# Patient Record
Sex: MALE | Race: WHITE | Employment: OTHER | RURAL
[De-identification: names, ages, dates, MRNs, and addresses within clinical notes are randomized per-mention and may not be internally consistent; named-entity substitution may affect disease eponyms.]

---

## 2018-01-30 ENCOUNTER — OFFICE VISIT (OUTPATIENT)
Dept: CARDIOLOGY CLINIC | Age: 73
End: 2018-01-30

## 2018-01-30 VITALS
OXYGEN SATURATION: 96 % | HEART RATE: 58 BPM | BODY MASS INDEX: 37.02 KG/M2 | SYSTOLIC BLOOD PRESSURE: 158 MMHG | HEIGHT: 65 IN | WEIGHT: 222.2 LBS | RESPIRATION RATE: 16 BRPM | DIASTOLIC BLOOD PRESSURE: 78 MMHG

## 2018-01-30 DIAGNOSIS — R06.02 SOB (SHORTNESS OF BREATH): ICD-10-CM

## 2018-01-30 DIAGNOSIS — I50.42 CHRONIC COMBINED SYSTOLIC AND DIASTOLIC CONGESTIVE HEART FAILURE (HCC): Primary | ICD-10-CM

## 2018-01-30 DIAGNOSIS — I10 ESSENTIAL HYPERTENSION: ICD-10-CM

## 2018-01-30 DIAGNOSIS — R60.9 FLUID RETENTION: ICD-10-CM

## 2018-01-30 DIAGNOSIS — C80.1 CANCER (HCC): ICD-10-CM

## 2018-01-30 DIAGNOSIS — N18.6 ESRD (END STAGE RENAL DISEASE) (HCC): ICD-10-CM

## 2018-01-30 RX ORDER — ALBUTEROL SULFATE 0.83 MG/ML
SOLUTION RESPIRATORY (INHALATION) AS NEEDED
COMMUNITY

## 2018-01-30 RX ORDER — SENNOSIDES 8.6 MG/1
1 TABLET ORAL DAILY
COMMUNITY

## 2018-01-30 RX ORDER — ALBUTEROL SULFATE 90 UG/1
AEROSOL, METERED RESPIRATORY (INHALATION) AS NEEDED
COMMUNITY

## 2018-01-30 RX ORDER — AMIODARONE HYDROCHLORIDE 200 MG/1
200 TABLET ORAL DAILY
COMMUNITY

## 2018-01-30 RX ORDER — ATORVASTATIN CALCIUM 20 MG/1
TABLET, FILM COATED ORAL DAILY
COMMUNITY

## 2018-01-30 RX ORDER — HYDRALAZINE HYDROCHLORIDE 25 MG/1
25 TABLET, FILM COATED ORAL EVERY 8 HOURS
COMMUNITY

## 2018-01-30 RX ORDER — LOSARTAN POTASSIUM 50 MG/1
TABLET ORAL DAILY
COMMUNITY

## 2018-01-30 RX ORDER — CALCIUM ACETATE 667 MG/1
CAPSULE ORAL
COMMUNITY

## 2018-01-30 RX ORDER — METOPROLOL SUCCINATE 50 MG/1
TABLET, EXTENDED RELEASE ORAL 2 TIMES DAILY
COMMUNITY

## 2018-01-30 NOTE — MR AVS SNAPSHOT
[address] [place] [id number] [phone] Patient: [employer] MRN: [id number] LOD:2/0/5051 Visit Information Date & Time Provider Department Dept. Phone Encounter #  
 1/30/2018  3:20 PM Dorys Shipman, [phone] Allergies as of 1/30/2018  Review Complete On: 1/30/2018 By: Dorys Shipman MD  
 No Known Allergies Current Immunizations  Never Reviewed No immunizations on file. Not reviewed this visit You Were Diagnosed With   
  
 Codes Comments Chronic combined systolic and diastolic congestive heart failure (HCC)    -  Primary ICD-10-CM: I50.42 
ICD-9-CM: 428.42, 428.0 ESRD (end stage renal disease) ([facility] [address]     ICD-10-CM: N18.6 ICD-9-CM: 585.6 Essential hypertension     ICD-10-CM: I10 
ICD-9-CM: 401.9 Fluid retention     ICD-10-CM: R60.9 ICD-9-CM: 276.69   
 SOB (shortness of breath)     ICD-10-CM: R06.02 
ICD-9-CM: 786.05 Cancer [facility])     ICD-10-CM: C80.1 ICD-9-CM: 199.1 Vitals BP Pulse Resp Height(growth percentile) Weight(growth percentile) SpO2  
 158/78 (BP 1 Location: Right arm, BP Patient Position: Sitting) (!) 58 16 5' 5\" (1.651 m) 222 lb 3.2 oz (100.8 kg) 96% BMI Smoking Status 36.98 kg/m2 Former Smoker Vitals History BMI and BSA Data Body Mass Index Body Surface Area  
 36.98 kg/m 2 2.15 m 2 Preferred Pharm[facility] Phone [phone] [facility] [phone] Your Updated Medication List  
  
   
This list is accurate as of: 1/30/18  4:04 PM.  Always use your most recent med list.  
  
  
  
  
 amiodarone 200 mg tablet Commonly known as:  CORDARONE Take 200 mg by mouth two (2) times a day. atorvastatin 20 mg tablet Commonly known as:  LIPITOR Take  by mouth daily. calcium acetate 667 mg Cap Commonly known as:  PHOSLO Take  by mouth three (3) times daily (with meals). And twice daily w/ snacks  
  
 hydrALAZINE 25 mg tablet Commonly known as:  APRESOLINE Take 25 mg by mouth every eight (8) hours. linaclotide 72 mcg Cap Take  by mouth daily. losartan 50 mg tablet Commonly known as:  COZAAR Take  by mouth daily. metoprolol succinate 50 mg XL tablet Commonly known as:  TOPROL-XL Take  by mouth two (2) times a day. RENAL-STAN PO Take  by mouth. Senna 8.6 mg tablet Generic drug:  senna Take 1 Tab by mouth daily. * VENTOLIN HFA 90 mcg/actuation inhaler Generic drug:  albuterol Take  by inhalation as needed for Wheezing. * albuterol 2.5 mg /3 mL (0.083 %) nebulizer solution Commonly known as:  PROVENTIL VENTOLIN  
by Nebulization route as needed for Wheezing. zolpidem 10 mg tablet Commonly known as:  AMBIEN Take 10 mg by mouth nightly as needed. * Notice: This list has 2 medication(s) that are the same as other medications prescribed for you. Read the directions carefully, and ask your doctor or other care provider to review them with you. We Performed the Following AMB POC EKG ROUTINE W/ 12 LEADS, INTER & REP [79425 CPT(R)] To-Do List   
 Around 02/06/2018 ECHO:  2D ECHO COMPLETE ADULT (TTE) W OR WO CONTR Introducing [facility] SERVICES! Philippe Parnell introduces [employer] patient portal. Now you can access parts of your medical record, email your doctor's office, and request medication refills online. 1. In your internet browser, go to [email or web address] Test 2. Click on the First Time User? Click Here link in the Sign In box. You will see the New Member Sign Up page. 3. Enter your [employer] Access Code exactly as it appears below. You will not need to use this code after youve completed the sign-up process.  If you do not sign up before the expiration date, you must request a new code. · [employer] Access Code: [email or web address] Expires: 4/30/2018  4:04 PM 
 
4. Enter the last four digits of your Social Security Number (xxxx) and Date of Birth (mm/dd/yyyy) as indicated and click Submit. You will be taken to the next sign-up page. 5. Create a [employer] ID. This will be your [employer] login ID and cannot be changed, so think of one that is secure and easy to remember. 6. Create a [employer] password. You can change your password at any time. 7. Enter your Password Reset Question and Answer. This can be used at a later time if you forget your password. 8. Enter your e-mail address. You will receive e-mail notification when new information is available in [address] 9. Click Sign Up. You can now view and download portions of your medical record. 10. Click the Download Summary menu link to download a portable copy of your medical information. If you have questions, please visit the Frequently Asked Questions section of the [employer] website. Remember, [employer] is NOT to be used for urgent needs. For medical emergencies, dial 911. Now available from your iPhone and Android! Please provide this summary of care documentation to your next provider. Your primary care clinician is listed as Suzette Boles. If you have any questions after today's visit, please call [phone].

## 2018-01-30 NOTE — PROGRESS NOTES
Verified patient with two patient identifiers. Medications reviewed/approved by Dr. Alonso Monge. Verbal from Dr. Alonso Monge to remove the medications that were deleted during the visit. Chief Complaint   Patient presents with    Shortness of Breath     New patient evaluation - referred by Dr. Tim Cortes (pt also c/o \"fluid retention. \"     1. Have you been to the ER, urgent care clinic since your last visit? Hospitalized since your last visit? New pt.    2. Have you seen or consulted any other health care providers outside of the [address] [place] [address] since your last visit? Include any pap smears or colon screening. New pt.

## 2018-01-30 NOTE — PROGRESS NOTES
Brittney Sharp is a 81 y.o. male is here for cardiac evaluation. Hx ESRD on HD, hypertension, CHF (?LVEF unknown), apparently paroxysmal afib (on amiodarone but no anticoag), dyslipidemia, colon Ca s/p sgy, prostate ca, DJD, s/p bilat TKR--is followed by Dr. Tillman Said. Recent URI/Bronchitis with assoc R CP--seen in ER at [facility] with neg w/u, dx'd musculoskeletal/bronchitis. Mild stable RIVERA, more before dialysis with ?fluid\". Very vague about past hx of CHF, ?after colon sgy ([facility])--no records, recent echo, etc.  No prior cath he knows of. Is on amiodarone but denies prior cardioversion, prior anticoagulation. Labs 12/29 with CBC (Hb 11.3), CMP (BUN 43/Cr 8.1, K 4.7), chol 240, LDL 83, HDL 39, TG 90, TSH 2.57, amiodarone 1.1. The patient denies chest pain, orthopnea, PND, LE edema, palpitations, syncope, presyncope or fatigue.        Patient Active Problem List    Diagnosis Date Noted    CHF (congestive heart failure) ([address]     Hypertension     ESRD (end stage renal disease) ([address]     Atrial fibrillation ([address]     Dyslipidemia     Cancer ([address] 01/01/2012      Andrews Cardenas MD  Past Medical History:   Diagnosis Date    Arthritis     Atrial fibrillation ([address]     Cancer [facility]) 2012    prostate    CHF (congestive heart failure) ([address]     Colon cancer ([address]     Dyslipidemia     ESRD (end stage renal disease) ([address]     Hypertension     Insomnia       Past Surgical History:   Procedure Laterality Date    HX COLECTOMY      HX ORTHOPAEDIC      bilateral knee replacements    HX PROSTATECTOMY      [facility] EGD TRANSORAL BIOPSY SINGLE/MULTIPLE  1/7/2013         [facility] SIGMOIDOSCOPY,BIOPSY  1/7/2013          No Known Allergies   Family History   Problem Relation Age of Onset    Cancer Mother     Coronary Artery Disease Mother     Cancer Father      lung      Social History     Social History    Marital status:      Spouse name: N/A    Number of children: N/A    Years of education: N/A     Occupational History    Not on file. Social History Main Topics    Smoking status: Former Smoker     Types: Cigarettes     Quit date: 1980    Smokeless tobacco: Never Used    Alcohol use Not on file    Drug use: No    Sexual activity: Not on file     Other Topics Concern    Not on file     Social History Narrative      Current Outpatient Prescriptions   Medication Sig    amiodarone (CORDARONE) 200 mg tablet Take 200 mg by mouth two (2) times a day.  atorvastatin (LIPITOR) 20 mg tablet Take  by mouth daily.  calcium acetate (PHOSLO) 667 mg cap Take  by mouth three (3) times daily (with meals). And twice daily w/ snacks    hydrALAZINE (APRESOLINE) 25 mg tablet Take 25 mg by mouth every eight (8) hours.  linaclotide 72 mcg cap Take  by mouth daily.  losartan (COZAAR) 50 mg tablet Take  by mouth daily.  metoprolol succinate (TOPROL-XL) 50 mg XL tablet Take  by mouth two (2) times a day.  senna (SENNA) 8.6 mg tablet Take 1 Tab by mouth daily.  FOLIC ACID/VIT B COMPLEX AND C (RENAL-STAN PO) Take  by mouth.  albuterol (VENTOLIN HFA) 90 mcg/actuation inhaler Take  by inhalation as needed for Wheezing.  albuterol (PROVENTIL VENTOLIN) 2.5 mg /3 mL (0.083 %) nebulizer solution by Nebulization route as needed for Wheezing.  zolpidem (AMBIEN) 10 mg tablet Take 10 mg by mouth nightly as needed. No current facility-administered medications for this visit. Review of Symptoms:    CONST  No weight change. No fever, chills, sweats    ENT No visual changes, URI sx, sore throat    CV  See HPI   RESP  No cough, or sputum, wheezing. Also see HPI   GI  No abdominal pain or change in bowel habits. No heartburn or dysphagia. No melena or rectal bleeding.   No dysuria, urgency, frequency, hematuria   MSKEL  No joint pain, swelling. No muscle pain. SKIN  No rash or lesions. NEURO  No headache, syncope, or seizure.    No weakness, loss of sensation, or paresthesias. PSYCH  No low mood or depression  No anxiety. HE/LYMPH  No easy bruising, abnormal bleeding, or enlarged glands. Physical ExamPhysical Exam:    Visit Vitals    /78 (BP 1 Location: Right arm, BP Patient Position: Sitting)    Pulse (!) 58    Resp 16    Ht 5' 5\" (1.651 m)    Wt 222 lb 3.2 oz (100.8 kg)    SpO2 96%    BMI 36.98 kg/m2     Gen: NAD  HEENT:  PERRL, throat clear  Neck: no adenopathy, no thyromegaly, no JVD   Heart:  Regular,Nl S1S2,  II/VI aortic systolic, II/VI MR, II/Vi aortic diastolic murmur, no gallop or rub.   Lungs:  clear  Abdomen:   Soft, non-tender, bowel sounds are active.   Extremities:  Mild edema, L arm AV fistula  Pulse: symmetric  Neuro: A&O times 3, No focal neuro deficits    Cardiographics    ECG: atrial connie 57, first degree AV block, NSST, LAE, NS IVC      Assessment:         Patient Active Problem List    Diagnosis Date Noted    CHF (congestive heart failure) (HCC)     Hypertension     ESRD (end stage renal disease) (HCC)     Atrial fibrillation (HCC)     Dyslipidemia     Cancer ([facility] 01/01/2012     Hx ESRD on HD, hypertension, CHF (?LVEF unknown), apparently paroxysmal afib (on amiodarone but no anticoag), dyslipidemia, colon Ca s/p sgy, prostate ca, DJD, s/p bilat TKR--is followed by Dr. Radha Hidalgo. Recent URI/Bronchitis with assoc R CP--seen in ER at [facility] with neg w/u, dx'd musculoskeletal/bronchitis. Mild stable RIVERA, more before dialysis with ?fluid\". Very vague about past hx of CHF, ?after colon sgy ([facility])--no records, recent echo, etc.  No prior cath he knows of. Is on amiodarone but denies prior cardioversion, prior anticoagulation. Labs 12/29 with CBC (Hb 11.3), CMP (BUN 43/Cr 8.1, K 4.7), chol 240, LDL 83, HDL 39, TG 90, TSH 2.57, amiodarone 1.1. Plan:      Will REDUCE the amiodarone to 200mg every day for now--connie, long first degree AVB  Continue the metoprolol XL, losartan, hydralazine  Continue station  Echo/doppler--LVEF, chambers, valves  F/u after testing to discuss   Check outside records when available  ?anticoag if prior afib (CHADs2-VASC 3-4), warfarin    Joceline Sahni MD

## 2018-02-28 ENCOUNTER — OFFICE VISIT (OUTPATIENT)
Dept: CARDIOLOGY CLINIC | Age: 73
End: 2018-02-28

## 2018-02-28 VITALS
BODY MASS INDEX: 36.82 KG/M2 | DIASTOLIC BLOOD PRESSURE: 86 MMHG | WEIGHT: 221 LBS | HEIGHT: 65 IN | SYSTOLIC BLOOD PRESSURE: 140 MMHG | OXYGEN SATURATION: 92 % | HEART RATE: 60 BPM | RESPIRATION RATE: 20 BRPM

## 2018-02-28 DIAGNOSIS — I48.91 ATRIAL FIBRILLATION, UNSPECIFIED TYPE (HCC): ICD-10-CM

## 2018-02-28 DIAGNOSIS — R06.02 SOB (SHORTNESS OF BREATH): ICD-10-CM

## 2018-02-28 DIAGNOSIS — N18.6 ESRD (END STAGE RENAL DISEASE) (HCC): ICD-10-CM

## 2018-02-28 DIAGNOSIS — C80.1 CANCER (HCC): ICD-10-CM

## 2018-02-28 DIAGNOSIS — I50.42 CHRONIC COMBINED SYSTOLIC AND DIASTOLIC CONGESTIVE HEART FAILURE (HCC): Primary | ICD-10-CM

## 2018-02-28 DIAGNOSIS — I10 ESSENTIAL HYPERTENSION: ICD-10-CM

## 2018-02-28 RX ORDER — FUROSEMIDE 20 MG/1
TABLET ORAL DAILY
COMMUNITY

## 2018-02-28 NOTE — PROGRESS NOTES
Saleem Mckenzie is a 81 y.o. male is here for routine f/u. Hx ESRD on HD, hypertension, CHF (?LVEF unknown), apparently paroxysmal afib (on amiodarone but no anticoag), dyslipidemia, colon Ca s/p sgy, prostate ca, DJD, s/p bilat TKR--is followed by Dr. Paty Caban. Recent URI/Bronchitis with assoc R CP--seen in ER at [facility] with neg w/u, dx'd musculoskeletal/bronchitis. Mild stable RIVERA, more before dialysis with ?fluid\". Very vague about past hx of CHF, ?after colon sgy ([facility])--no records, recent echo, etc.  No prior cath he knows of. Is on amiodarone but denies prior cardioversion, prior anticoagulation . Labs 12/29 with CBC (Hb 11.3), CMP (BUN 43/Cr 8.1, K 4.7), chol 240, LDL 83, HDL 39, TG 90, TSH 2.57, amiodarone 1.1.  Echo 2/6/18 with LVH, LVEF 76-08, diastolic dysfunction, LAE, mild AS/AI, MAC with minimal MV stenosis. Chest CT scan 2/21/18 with RUL mass, hilar nodes and possible splenic mass. The patient denies chest pain, orthopnea, PND, LE edema, palpitations, syncope, presyncope or fatigue.        Patient Active Problem List    Diagnosis Date Noted    CHF (congestive heart failure) ([address]     Hypertension     ESRD (end stage renal disease) ([address]     Atrial fibrillation ([address]     Dyslipidemia     Cancer ([address] 01/01/2012      Micaela Goddard MD  Past Medical History:   Diagnosis Date    Arthritis     Atrial fibrillation ([address]     Cancer [facility]) 2012    prostate    CHF (congestive heart failure) ([address]     Colon cancer ([address]     Dyslipidemia     ESRD (end stage renal disease) ([address]     Hypertension     Insomnia       Past Surgical History:   Procedure Laterality Date    HX COLECTOMY      HX ORTHOPAEDIC      bilateral knee replacements    HX PROSTATECTOMY      [facility] EGD TRANSORAL BIOPSY SINGLE/MULTIPLE  1/7/2013         [facility] SIGMOIDOSCOPY,BIOPSY  1/7/2013          Allergies   Allergen Reactions    Ciprofloxacin Unknown (comments)    Lortab [Hydrocodone-Acetaminophen] Unknown (comments)    Trazodone Unknown (comments)      Family History   Problem Relation Age of Onset    Cancer Mother     Coronary Artery Disease Mother     Cancer Father      lung      Social History     Social History    Marital status:      Spouse name: N/A    Number of children: N/A    Years of education: N/A     Occupational History    Not on file. Social History Main Topics    Smoking status: Former Smoker     Types: Cigarettes     Quit date: 1980    Smokeless tobacco: Never Used    Alcohol use Not on file    Drug use: No    Sexual activity: Not on file     Other Topics Concern    Not on file     Social History Narrative      Current Outpatient Prescriptions   Medication Sig    furosemide (LASIX) 20 mg tablet Take  by mouth daily.  amiodarone (CORDARONE) 200 mg tablet Take 200 mg by mouth two (2) times a day.  atorvastatin (LIPITOR) 20 mg tablet Take  by mouth daily.  calcium acetate (PHOSLO) 667 mg cap Take  by mouth three (3) times daily (with meals). And twice daily w/ snacks    hydrALAZINE (APRESOLINE) 25 mg tablet Take 25 mg by mouth every eight (8) hours.  linaclotide 72 mcg cap Take  by mouth daily.  losartan (COZAAR) 50 mg tablet Take  by mouth daily.  metoprolol succinate (TOPROL-XL) 50 mg XL tablet Take  by mouth two (2) times a day.  senna (SENNA) 8.6 mg tablet Take 1 Tab by mouth daily.  FOLIC ACID/VIT B COMPLEX AND C (RENAL-STAN PO) Take  by mouth.  albuterol (VENTOLIN HFA) 90 mcg/actuation inhaler Take  by inhalation as needed for Wheezing.  albuterol (PROVENTIL VENTOLIN) 2.5 mg /3 mL (0.083 %) nebulizer solution by Nebulization route as needed for Wheezing.  zolpidem (AMBIEN) 10 mg tablet Take 10 mg by mouth nightly as needed. No current facility-administered medications for this visit. Review of Symptoms:    CONST  No weight change.  No fever, chills, sweats    ENT No visual changes, URI sx, sore throat    CV  See HPI   RESP No cough, or sputum, wheezing. Also see HPI   GI  No abdominal pain or change in bowel habits. No heartburn or dysphagia. No melena or rectal bleeding.   No dysuria, urgency, frequency, hematuria   MSKEL  No joint pain, swelling. No muscle pain. SKIN  No rash or lesions. NEURO  No headache, syncope, or seizure. No weakness, loss of sensation, or paresthesias. PSYCH  No low mood or depression  No anxiety. HE/LYMPH  No easy bruising, abnormal bleeding, or enlarged glands. Physical ExamPhysical Exam:    Visit Vitals    /86 (BP 1 Location: Right arm, BP Patient Position: Sitting)    Pulse 60    Resp 20    Ht 5' 5\" (1.651 m)    Wt 221 lb (100.2 kg)    SpO2 92%    BMI 36.78 kg/m2     Gen: NAD  HEENT:  PERRL, throat clear  Neck: no adenopathy, no thyromegaly, no JVD   Heart:  Regular,Nl S1S2,  II/VI murmur, gallop or rub.   Lungs:  clear  Abdomen:   Soft, non-tender, bowel sounds are active.   Extremities:  No edema  Pulse: symmetric  Neuro: A&O times 3, No focal neuro deficits    Cardiographics      Assessment:         Patient Active Problem List    Diagnosis Date Noted    CHF (congestive heart failure) (HCC)     Hypertension     ESRD (end stage renal disease) (HCC)     Atrial fibrillation (HCC)     Dyslipidemia     [facility] ([facility] 01/01/2012     Hx ESRD on HD, hypertension, CHF (?LVEF unknown), apparently paroxysmal afib (on amiodarone but no anticoag), dyslipidemia, colon Ca s/p sgy, prostate ca, DJD, s/p bilat TKR--is followed by Dr. Joaquina Capps. Recent URI/Bronchitis with assoc R CP--seen in ER at [facility] with neg w/u, dx'd musculoskeletal/bronchitis. Mild stable RIVERA, more before dialysis with ?fluid\". Very vague about past hx of CHF, ?after colon sgy ([facility])--no records, recent echo, etc.  No prior cath he knows of. Is on amiodarone but denies prior cardioversion, prior anticoagulation . Labs 12/29 with CBC (Hb 11.3), CMP (BUN 43/Cr 8.1, K 4.7), chol 240, LDL 83, HDL 39, TG 90, TSH 2.57, amiodarone 1.1.  Echo 2/6/18 with LVH, LVEF 76-77, diastolic dysfunction, LAE, mild AS/AI, MAC with minimal MV stenosis. Chest CT scan 2/21/18 with RUL mass, hilar nodes and possible splenic mass.       Findings c/w diastolic CHF, compensated     Plan:     Amiodarone reduced to 200mg once daily, prob eventually will d/c altogether (no clear indication, long term toxicity)  Continue other meds  Pulmonary consult--likely lung ca  F/u with PCP as planned  RTC here 3 mos, sooner prn    Melba Segovia MD

## 2018-02-28 NOTE — PROGRESS NOTES
Verified patient with two patient identifiers. Medications reviewed/approved by Dr. Angélica Boone. Verbal from Dr. Angélica Boone to remove the medications that were deleted during the visit. Chief Complaint   Patient presents with    Irregular Heart Beat     4 week follow up    CHF    Hypertension     1. Have you been to the ER, urgent care clinic since your last visit? Hospitalized since your last visit? No.    2. Have you seen or consulted any other health care providers outside of the [address] [place] [address] since your last visit? Include any pap smears or colon screening. Yes, Dr. Sophia Bay.

## 2018-02-28 NOTE — PATIENT INSTRUCTIONS
REDUCE YOUR AMIODARONE FREQUENCY TO ONCE A DAY:  amiodarone (CORDARONE) 200 mg tablet  Take 200 mg by mouth daily.

## 2018-02-28 NOTE — MR AVS SNAPSHOT
[address] 
 
 
 [address] [facility] [address] [phone] Patient: [employer] MRN: [id number] Visit Information Date & Time Provider Department Dept. Phone Encounter #  
 2/28/2018  9:20 AM Dorys Shipman, [address] [facility] [phone] [id number] Your Appointments 5/30/2018 10:20 AM  
ESTABLISHED PATIENT with Dorys Shipman MD  
[address] [place] [facility] [address] [facility]) Appt Note: 3 mo fu $cp  
 [address] [facility] [address] [phone]  
  
   
 [address] [place] [id number] Upcoming Health Maintenance Date Due Hepatitis C Screening 1945 DTaP/Tdap/Td series (1 - Tdap) 8/1/1968 FOBT Q 1 YEAR AGE 50-75 8/1/1997 ZOSTER VACCINE AGE 60> 6/1/2007 GLAUCOMA SCREENING Q2Y 8/1/2012 Pneumococcal 65+ High/Highest Risk (1 of 2 - PCV13) 8/1/2012 MEDICARE YEARLY EXAM 8/1/2012 Allergies as of 2/28/2018  Review Complete On: 2/28/2018 By: Pj Gilbert LPN Severity Noted Reaction Type Reactions Ciprofloxacin  02/28/2018    Unknown (comments) Lortab [Hydrocodone-acetaminophen]  02/28/2018    Unknown (comments) Trazodone  02/28/2018    Unknown (comments) Current Immunizations  Never Reviewed No immunizations on file. Not reviewed this visit Vitals BP Pulse Resp Height(growth percentile) Weight(growth percentile) SpO2  
 140/86 (BP 1 Location: Right arm, BP Patient Position: Sitting) 60 20 5' 5\" (1.651 m) 221 lb (100.2 kg) 92% BMI Smoking Status 36.78 kg/m2 Former Smoker Vitals History BMI and BSA Data Body Mass Index Body Surface Area 36.78 kg/m 2 2.14 m 2 Preferred Pharmacy Pharmacy Name Phone [facility] [phone] [place] [address] [phone] Your Updated Medication List  
  
   
 This list is accurate as of 2/28/18  9:40 AM.  Always use your most recent med list.  
  
  
  
  
 amiodarone 200 mg tablet Commonly known as:  CORDARONE Take 200 mg by mouth daily. atorvastatin 20 mg tablet Commonly known as:  LIPITOR Take  by mouth daily. calcium acetate 667 mg Cap Commonly known as:  PHOSLO Take  by mouth three (3) times daily (with meals). And twice daily w/ snacks  
  
 furosemide 20 mg tablet Commonly known as:  LASIX Take  by mouth daily. hydrALAZINE 25 mg tablet Commonly known as:  APRESOLINE Take 25 mg by mouth every eight (8) hours. linaclotide 72 mcg Cap Take  by mouth daily. losartan 50 mg tablet Commonly known as:  COZAAR Take  by mouth daily. metoprolol succinate 50 mg XL tablet Commonly known as:  TOPROL-XL Take  by mouth two (2) times a day. RENAL-STAN PO Take  by mouth. Senna 8.6 mg tablet Generic drug:  senna Take 1 Tab by mouth daily. * VENTOLIN HFA 90 mcg/actuation inhaler Generic drug:  albuterol Take  by inhalation as needed for Wheezing. * albuterol 2.5 mg /3 mL (0.083 %) nebulizer solution Commonly known as:  PROVENTIL VENTOLIN  
by Nebulization route as needed for Wheezing. zolpidem 10 mg tablet Commonly known as:  AMBIEN Take 10 mg by mouth nightly as needed. * Notice: This list has 2 medication(s) that are the same as other medications prescribed for you. Read the directions carefully, and ask your doctor or other care provider to review them with you. Patient Instructions REDUCE YOUR AMIODARONE FREQUENCY TO ONCE A DAY: 
amiodarone (CORDARONE) 200 mg tablet Take 200 mg by mouth daily. Introducing [facility]! [facility] introduces [employer] patient portal. Now you can access parts of your medical record, email your doctor's office, and request medication refills online.    
 
1. In your internet browser, go to [email or web address] 2. Click on the First Time User? Click Here link in the Sign In box. You will see the New Member Sign Up page. 3. Enter your [employer] Access Code exactly as it appears below. You will not need to use this code after youve completed the sign-up process. If you do not sign up before the expiration date, you must request a new code. · [employer] Access Code: [email or web address] Expires: 4/30/2018  4:04 PM 
 
4. Enter the last four digits of your Social Security Number (xxxx) and Date of Birth (mm/dd/yyyy) as indicated and click Submit. You will be taken to the next sign-up page. 5. Create a ithinksportt ID. This will be your [employer] login ID and cannot be changed, so think of one that is secure and easy to remember. 6. Create a [employer] password. You can change your password at any time. 7. Enter your Password Reset Question and Answer. This can be used at a later time if you forget your password. 8. Enter your e-mail address. You will receive e-mail notification when new information is available in [address] 9. Click Sign Up. You can now view and download portions of your medical record. 10. Click the Download Summary menu link to download a portable copy of your medical information. If you have questions, please visit the Frequently Asked Questions section of the [employer] website. Remember, [employer] is NOT to be used for urgent needs. For medical emergencies, dial 911. Now available from your iPhone and Android! Please provide this summary of care documentation to your next provider. Your primary care clinician is listed as Pascual Haynes. If you have any questions after today's visit, please call [phone].